# Patient Record
Sex: FEMALE | Race: OTHER | HISPANIC OR LATINO | URBAN - METROPOLITAN AREA
[De-identification: names, ages, dates, MRNs, and addresses within clinical notes are randomized per-mention and may not be internally consistent; named-entity substitution may affect disease eponyms.]

---

## 2024-08-18 ENCOUNTER — EMERGENCY (EMERGENCY)
Facility: HOSPITAL | Age: 44
LOS: 0 days | Discharge: HOME | End: 2024-08-19
Attending: EMERGENCY MEDICINE
Payer: SELF-PAY

## 2024-08-18 VITALS
DIASTOLIC BLOOD PRESSURE: 81 MMHG | OXYGEN SATURATION: 99 % | RESPIRATION RATE: 18 BRPM | HEART RATE: 102 BPM | TEMPERATURE: 100 F | SYSTOLIC BLOOD PRESSURE: 170 MMHG | HEIGHT: 62 IN | WEIGHT: 174.17 LBS

## 2024-08-18 DIAGNOSIS — R00.0 TACHYCARDIA, UNSPECIFIED: ICD-10-CM

## 2024-08-18 DIAGNOSIS — R20.2 PARESTHESIA OF SKIN: ICD-10-CM

## 2024-08-18 DIAGNOSIS — R07.89 OTHER CHEST PAIN: ICD-10-CM

## 2024-08-18 DIAGNOSIS — R20.0 ANESTHESIA OF SKIN: ICD-10-CM

## 2024-08-18 LAB
ALBUMIN SERPL ELPH-MCNC: 3.4 G/DL — SIGNIFICANT CHANGE UP (ref 3.3–5)
ALP SERPL-CCNC: 88 U/L — SIGNIFICANT CHANGE UP (ref 40–120)
ALT FLD-CCNC: 22 U/L — SIGNIFICANT CHANGE UP (ref 12–78)
ANION GAP SERPL CALC-SCNC: 7 MMOL/L — SIGNIFICANT CHANGE UP (ref 5–17)
APTT BLD: 36.1 SEC — HIGH (ref 24.5–35.6)
AST SERPL-CCNC: 16 U/L — SIGNIFICANT CHANGE UP (ref 15–37)
BASOPHILS # BLD AUTO: 0.05 K/UL — SIGNIFICANT CHANGE UP (ref 0–0.2)
BASOPHILS NFR BLD AUTO: 0.5 % — SIGNIFICANT CHANGE UP (ref 0–2)
BILIRUB SERPL-MCNC: 0.2 MG/DL — SIGNIFICANT CHANGE UP (ref 0.2–1.2)
BUN SERPL-MCNC: 11 MG/DL — SIGNIFICANT CHANGE UP (ref 7–23)
CALCIUM SERPL-MCNC: 8.6 MG/DL — SIGNIFICANT CHANGE UP (ref 8.5–10.1)
CHLORIDE SERPL-SCNC: 111 MMOL/L — HIGH (ref 96–108)
CO2 SERPL-SCNC: 24 MMOL/L — SIGNIFICANT CHANGE UP (ref 22–31)
CREAT SERPL-MCNC: 0.77 MG/DL — SIGNIFICANT CHANGE UP (ref 0.5–1.3)
D DIMER BLD IA.RAPID-MCNC: <150 NG/ML DDU — SIGNIFICANT CHANGE UP
EGFR: 96 ML/MIN/1.73M2 — SIGNIFICANT CHANGE UP
EOSINOPHIL # BLD AUTO: 0.04 K/UL — SIGNIFICANT CHANGE UP (ref 0–0.5)
EOSINOPHIL NFR BLD AUTO: 0.4 % — SIGNIFICANT CHANGE UP (ref 0–6)
GLUCOSE SERPL-MCNC: 114 MG/DL — HIGH (ref 70–99)
HCG SERPL-ACNC: <1 MIU/ML — SIGNIFICANT CHANGE UP
HCT VFR BLD CALC: 30.7 % — LOW (ref 34.5–45)
HGB BLD-MCNC: 10.2 G/DL — LOW (ref 11.5–15.5)
IMM GRANULOCYTES NFR BLD AUTO: 0.5 % — SIGNIFICANT CHANGE UP (ref 0–0.9)
INR BLD: 1.05 RATIO — SIGNIFICANT CHANGE UP (ref 0.85–1.18)
LYMPHOCYTES # BLD AUTO: 3.5 K/UL — HIGH (ref 1–3.3)
LYMPHOCYTES # BLD AUTO: 31.7 % — SIGNIFICANT CHANGE UP (ref 13–44)
MAGNESIUM SERPL-MCNC: 1.8 MG/DL — SIGNIFICANT CHANGE UP (ref 1.6–2.6)
MCHC RBC-ENTMCNC: 27.3 PG — SIGNIFICANT CHANGE UP (ref 27–34)
MCHC RBC-ENTMCNC: 33.2 GM/DL — SIGNIFICANT CHANGE UP (ref 32–36)
MCV RBC AUTO: 82.1 FL — SIGNIFICANT CHANGE UP (ref 80–100)
MONOCYTES # BLD AUTO: 0.59 K/UL — SIGNIFICANT CHANGE UP (ref 0–0.9)
MONOCYTES NFR BLD AUTO: 5.3 % — SIGNIFICANT CHANGE UP (ref 2–14)
NEUTROPHILS # BLD AUTO: 6.82 K/UL — SIGNIFICANT CHANGE UP (ref 1.8–7.4)
NEUTROPHILS NFR BLD AUTO: 61.6 % — SIGNIFICANT CHANGE UP (ref 43–77)
PLATELET # BLD AUTO: 413 K/UL — HIGH (ref 150–400)
POTASSIUM SERPL-MCNC: 3.9 MMOL/L — SIGNIFICANT CHANGE UP (ref 3.5–5.3)
POTASSIUM SERPL-SCNC: 3.9 MMOL/L — SIGNIFICANT CHANGE UP (ref 3.5–5.3)
PROT SERPL-MCNC: 7.5 GM/DL — SIGNIFICANT CHANGE UP (ref 6–8.3)
PROTHROM AB SERPL-ACNC: 11.8 SEC — SIGNIFICANT CHANGE UP (ref 9.5–13)
RBC # BLD: 3.74 M/UL — LOW (ref 3.8–5.2)
RBC # FLD: 14 % — SIGNIFICANT CHANGE UP (ref 10.3–14.5)
SODIUM SERPL-SCNC: 142 MMOL/L — SIGNIFICANT CHANGE UP (ref 135–145)
TROPONIN I, HIGH SENSITIVITY RESULT: <3 NG/L — SIGNIFICANT CHANGE UP
WBC # BLD: 11.05 K/UL — HIGH (ref 3.8–10.5)
WBC # FLD AUTO: 11.05 K/UL — HIGH (ref 3.8–10.5)

## 2024-08-18 PROCEDURE — 84702 CHORIONIC GONADOTROPIN TEST: CPT

## 2024-08-18 PROCEDURE — 99053 MED SERV 10PM-8AM 24 HR FAC: CPT

## 2024-08-18 PROCEDURE — 85610 PROTHROMBIN TIME: CPT

## 2024-08-18 PROCEDURE — 85730 THROMBOPLASTIN TIME PARTIAL: CPT

## 2024-08-18 PROCEDURE — 36415 COLL VENOUS BLD VENIPUNCTURE: CPT

## 2024-08-18 PROCEDURE — 84484 ASSAY OF TROPONIN QUANT: CPT

## 2024-08-18 PROCEDURE — 93005 ELECTROCARDIOGRAM TRACING: CPT

## 2024-08-18 PROCEDURE — 80053 COMPREHEN METABOLIC PANEL: CPT

## 2024-08-18 PROCEDURE — 93010 ELECTROCARDIOGRAM REPORT: CPT

## 2024-08-18 PROCEDURE — 83735 ASSAY OF MAGNESIUM: CPT

## 2024-08-18 PROCEDURE — 85025 COMPLETE CBC W/AUTO DIFF WBC: CPT

## 2024-08-18 PROCEDURE — 99285 EMERGENCY DEPT VISIT HI MDM: CPT | Mod: 25

## 2024-08-18 PROCEDURE — 71045 X-RAY EXAM CHEST 1 VIEW: CPT | Mod: 26

## 2024-08-18 PROCEDURE — 99285 EMERGENCY DEPT VISIT HI MDM: CPT

## 2024-08-18 PROCEDURE — 85379 FIBRIN DEGRADATION QUANT: CPT

## 2024-08-18 PROCEDURE — 71045 X-RAY EXAM CHEST 1 VIEW: CPT

## 2024-08-18 NOTE — ED PROVIDER NOTE - NSFOLLOWUPINSTRUCTIONS_ED_ALL_ED_FT
Dolor de pecho inespecífico  Nonspecific Chest Pain    El dolor de pecho puede deberse a muchas afecciones diferentes. Existe aris posibilidad de que el dolor esté relacionado con algo grave, perla un ataque cardíaco o un coágulo sanguíneo en los pulmones. Hay muchas afecciones que no son potencialmente mortales que pueden causar dolor de pecho. Si tiene dolor de pecho, es muy importante que concurra a las visitas de seguimiento con el médico.    Siga estas indicaciones en riojas casa:  Medicamentos    Si le recetaron un antibiótico, tómelo perla se lo haya indicado el médico. No deje de sathish el antibiótico aunque comience a sentirse mejor.  [place] los medicamentos de venta ken y los recetados solamente perla se lo haya indicado el médico.    Estilo de jeffrey    No consuma ningún producto que contenga nicotina o tabaco, perla cigarrillos y cigarrillos electrónicos. Si necesita ayuda para dejar de fumar, consulte al médico.  No aiden alcohol.  Comfort cambios en riojas estilo de jeffrey según las indicaciones del médico. Estos pueden incluir lo siguiente:   Practicar actividad física con regularidad. Pídale al médico que le sugiera algunas actividades que amanda seguras para usted.  Consumir aris dieta cardiosaludable. Un especialista en alimentación (nutricionista) puede ayudarlo a que comfort elecciones saludables.  Mantener un peso saludable.  Controlar la diabetes, si es necesario.  Disminuir el estrés, por ejemplo, con técnicas de respiración profunda o contacto con la naturaleza.    Instrucciones generales    Evite las actividades que le causen dolor de pecho.  Si el dolor de pecho se debe a acidez estomacal:  Levante (eleve) la cabecera de la cama aproximadamente 6 pulgadas (15 cm). Para ello, coloque bloques debajo de las patas de la cama, en la cabecera.  No duerma con almohadas adicionales debajo de la yumiko. [place] no ayuda a aliviar la acidez estomacal.  Concurra a todas las visitas de seguimiento perla se lo haya indicado el médico. Strawberry es importante. [place]rawberry incluye otros estudios si el dolor de pecho no desaparece.    Comuníquese con un médico si:  El dolor de pecho no desaparece.  Tiene aris erupción cutánea con ampollas en el pecho.  Tiene fiebre.  Tiene escalofríos.    Solicite ayuda de inmediato si:  El dolor en el pecho es más intenso.  Tiene tos que empeora o tose con misha.   Tiene dolor muy intenso en el vientre (abdomen).  Se siente muy débil.  Pierde el conocimiento (se desmaya).  Tiene cualquiera de estos síntomas sin ninguna causa alonso:  Malestar repentino en el pecho.  Molestias repentinas en los brazos, la espalda, el rodolfo o la mandíbula.  Le falta el aire en cualquier momento.  Comienza a sudar de manera repentina o la piel se le humedece.  Siente malestar estomacal (náuseas).  Vomita.  Se siente repentinamente mareado o se desmaya.  El corazón comienza a latirle rápidamente o parece que se saltea latidos.    Estos síntomas pueden indicar aris emergencia. No espere hasta que los síntomas desaparezcan. Solicite atención médica de inmediato. Comuníquese con el servicio de emergencias de riojas localidad (911 en los [place]). No conduzca por juan antonio propios medios hasta el hospital.

## 2024-08-18 NOTE — ED PROVIDER NOTE - CARE PROVIDER_API CALL
Willis Tamayo  Cardiovascular Disease  [address] [facility] [address]  [place] [address]  Phone: [phone]  Fax: [phone]  Follow Up Time: 1-3 Days

## 2024-08-18 NOTE — ED PROVIDER NOTE - OBJECTIVE STATEMENT
44-year-old female with no pertinent past medical history presents for evaluation of sudden onset of midsternal chest pressure that began approximately 3 hours prior to arrival while she was walking through the house with numbness and tingling of the left arm.  Patient states that she had a similar sensation in her chest about a week ago that was brief and did not get evaluated for it at that time.  Patient denies any recent travel.  Patient denies any recent heavy lifting or trauma.  Patient notes that the pain is mild at this time.  Was seen in urgent care just prior to arrival where they performed an EKG that was unremarkable and gave her 324 mg of aspirin prior to arrival.  Patient's daughter notes that she has been increasingly anxious recently.  The patient denies any history of alcohol, tobacco or drug use. Patient's physical exam is unremarkable and her EKG shows normal sinus rhythm with a rate of 98 bpm, no ST segment elevation/depressions/T wave inversions.  Patient's lungs are clear to auscultation bilaterally and her oxygen saturations are 100% on room air however, upon sitting she does become tachycardic with heart rate up to 110 bpm/sinus tachycardia.  Will get cardiac workup to include troponin–I x 2, cardiac monitor, D-dimer and if positive will get CTA of the chest to rule out PE

## 2024-08-18 NOTE — ED PROVIDER NOTE - PATIENT PORTAL LINK FT
You can access the FollowMyHealth Patient Portal offered by [facility] by registering at the following website: [email or web address]. By joining [facility]’s FollowMyHealth portal, you will also be able to view your health information using other applications (apps) compatible with our system.

## 2024-08-18 NOTE — ED PROVIDER NOTE - WR ORDER STATUS 1
Spoke to pt. Ot requeste form to be upload on [employer] as it was done with Dr Brownlee. Informed pt I was not sure if we will be able to but I will ask assistance from Dr Brownlee staff. Naz MENJIVAR, was able to upload form to [employer]. Pt was notified. No further questions or concerns.    Render In Strict Bullet Format?: No Detail Level: Zone Discontinue Regimen: Triamcinolone Performed Resulted

## 2024-08-18 NOTE — ED ADULT TRIAGE NOTE - CHIEF COMPLAINT QUOTE
Patient ambulatory to the ER c/o constant left sided chest pain starting at 5pm. Endorses having left arm numbness when the chest pain is present. Patient reports having the same symptoms last week but they resolved. Seen at urgent care PTA and was sent to the ER; 324 of aspirin was given and an ekg was done at urgent care. Denies SOB. Taken for stat ekg

## 2024-08-19 VITALS
OXYGEN SATURATION: 100 % | DIASTOLIC BLOOD PRESSURE: 86 MMHG | HEART RATE: 77 BPM | SYSTOLIC BLOOD PRESSURE: 133 MMHG | TEMPERATURE: 98 F | RESPIRATION RATE: 18 BRPM

## 2024-08-19 LAB — TROPONIN I, HIGH SENSITIVITY RESULT: 3.45 NG/L — SIGNIFICANT CHANGE UP

## 2024-08-19 NOTE — ED ADULT NURSE NOTE - CHIEF COMPLAINT QUOTE
Patient ambulatory to the ER c/o constant left sided chest pain starting at 5pm. Endorses having left arm numbness when the chest pain is present. Patient reports having the same symptoms last week but they resolved. Seen at urgent care PTA and was sent to the ER; 324 of aspirin was given and an ekg was done at urgent care. Denies SOB. Taken for stat ekg
home

## 2024-08-19 NOTE — ED ADULT NURSE NOTE - OBJECTIVE STATEMENT
pt states she had chest pain went to urgent care and sent to ED. pt ao x4 daughter at bedside questions answered